# Patient Record
Sex: FEMALE | Race: WHITE
[De-identification: names, ages, dates, MRNs, and addresses within clinical notes are randomized per-mention and may not be internally consistent; named-entity substitution may affect disease eponyms.]

---

## 2022-04-04 NOTE — ED
Abdominal Pain HPI





- General


Chief Complaint: Vaginal Bleeding


Stated Complaint: Vaginal Bleeding,N/V,Back Pain


Time Seen by Provider: 04/04/22 00:03


Source: patient, family, RN notes reviewed, old records reviewed


Mode of arrival: wheelchair


Limitations: no limitations





- History of Present Illness


Initial Comments: 





This is a 41-year-old female to the emergency department for evaluation.  

Patient coming in for evaluation multiple complaints patient feels very cold 

weak and tired.  She does have significant back pain as well as abdominal pain. 

Patient has no other complaints.  Patient does suffer from significantly heavy 

periods with significant amount of pain with her periods recently started on in 

birth control otherwise no medical history takes no medications does have 

history of fibroids but no surgeries


MD Complaint: abdominal pain


-: days(s) (4)


Location: suprapubic


Radiation: suprapubic, back


Migration to: suprapubic, bilateral flank


Severity: severe


Severity scale (1-10): 8


Quality: aching, sharp


Improves With: nothing


Worsens With: nothing


Associated Symptoms: nausea, fever


Treatments Prior to Arrival: NSAIDs





- Related Data


                                  Previous Rx's











 Medication  Instructions  Recorded


 


Cephalexin [Keflex] 500 mg PO Q6HR #28 cap 04/04/22











                                    Allergies











Allergy/AdvReac Type Severity Reaction Status Date / Time


 


No Known Allergies Allergy   Verified 04/03/22 22:23














Review of Systems


ROS Statement: 


Those systems with pertinent positive or pertinent negative responses have been 

documented in the HPI.





ROS Other: All systems not noted in ROS Statement are negative.





Past Medical History


Past Medical History: No Reported History


History of Any Multi-Drug Resistant Organisms: MRSA


Date of last positivie culture/infection: 10/26/21


MDRO Source:: MRSA EAR


Past Surgical History: No Surgical Hx Reported


Past Psychological History: No Psychological Hx Reported


Smoking Status: Current every day smoker


Past Alcohol Use History: Rare


Past Drug Use History: None Reported





General Exam


General appearance: alert, in no apparent distress, anxious


Head exam: Present: atraumatic, normocephalic, normal inspection


Eye exam: Present: normal appearance, PERRL, EOMI.  Absent: scleral icterus, 

conjunctival injection, periorbital swelling


ENT exam: Present: normal exam, mucous membranes moist


Neck exam: Present: normal inspection.  Absent: tenderness, meningismus, 

lymphadenopathy


Respiratory exam: Present: normal lung sounds bilaterally.  Absent: respiratory 

distress, wheezes, rales, rhonchi, stridor


Cardiovascular Exam: Present: normal rhythm, tachycardia, normal heart sounds.  

Absent: systolic murmur, diastolic murmur, rubs, gallop, clicks


GI/Abdominal exam: Present: soft, normal bowel sounds.  Absent: distended, 

tenderness, guarding, rebound, rigid


Extremities exam: Present: normal inspection, full ROM, normal capillary refill.

  Absent: tenderness, pedal edema, joint swelling, calf tenderness


Back exam: Present: normal inspection


Neurological exam: Present: alert, oriented X3, CN II-XII intact


Psychiatric exam: Present: normal affect, normal mood


Skin exam: Present: warm, dry, intact, normal color.  Absent: rash





Course


                                   Vital Signs











  04/03/22 04/04/22





  22:19 02:11


 


Temperature 100.3 F H 


 


Pulse Rate 111 H 82


 


Respiratory 18 18





Rate  


 


Blood Pressure 148/94 126/77


 


O2 Sat by Pulse 100 99





Oximetry  














- Reevaluation(s)


Reevaluation #1: 





04/04/22 01:35


Medical record is reviewed


Reevaluation #2: 





04/04/22 01:35


Patient informed of her recurrent fever


Reevaluation #3: 





04/04/22 03:00


Patient informed results and questions answered


Reevaluation #4: 





04/04/22 03:00


Patient does not currently feel like she might pass out





Medical Decision Making





- Medical Decision Making





41-year-old female DF for evaluation patient coming with excessive vaginal 

bleeding had a vaginal bleeding.  Patient hemoglobin is not a treatable limit 

here in the emergency department.  Patient will be placed on antibiotics 

secondary to fever likely UTI.  Patient placed on antibiotics and can be 

discharged home





- Lab Data


Result diagrams: 


                                 04/03/22 23:21





                                 04/03/22 23:21


                                   Lab Results











  04/03/22 04/03/22 04/04/22 Range/Units





  23:21 23:21 00:24 


 


WBC  10.8 H    (3.8-10.6)  k/uL


 


RBC  4.41    (3.80-5.40)  m/uL


 


Hgb  13.4    (11.4-16.0)  gm/dL


 


Hct  40.5    (34.0-46.0)  %


 


MCV  91.9    (80.0-100.0)  fL


 


MCH  30.4    (25.0-35.0)  pg


 


MCHC  33.1    (31.0-37.0)  g/dL


 


RDW  12.9    (11.5-15.5)  %


 


Plt Count  410    (150-450)  k/uL


 


MPV  7.5    


 


Neutrophils %  65    %


 


Lymphocytes %  27    %


 


Monocytes %  3    %


 


Eosinophils %  3    %


 


Basophils %  0    %


 


Neutrophils #  7.0    (1.3-7.7)  k/uL


 


Lymphocytes #  2.9    (1.0-4.8)  k/uL


 


Monocytes #  0.4    (0-1.0)  k/uL


 


Eosinophils #  0.3    (0-0.7)  k/uL


 


Basophils #  0.0    (0-0.2)  k/uL


 


Sodium   136 L   (137-145)  mmol/L


 


Potassium   3.9   (3.5-5.1)  mmol/L


 


Chloride   107   ()  mmol/L


 


Carbon Dioxide   21 L   (22-30)  mmol/L


 


Anion Gap   8   mmol/L


 


BUN   11   (7-17)  mg/dL


 


Creatinine   0.63   (0.52-1.04)  mg/dL


 


Est GFR (CKD-EPI)AfAm   >90   (>60 ml/min/1.73 sqM)  


 


Est GFR (CKD-EPI)NonAf   >90   (>60 ml/min/1.73 sqM)  


 


Glucose   103 H   (74-99)  mg/dL


 


Calcium   9.2   (8.4-10.2)  mg/dL


 


Total Bilirubin   0.6   (0.2-1.3)  mg/dL


 


AST   31   (14-36)  U/L


 


ALT   22   (4-34)  U/L


 


Alkaline Phosphatase   78   ()  U/L


 


Total Protein   7.5   (6.3-8.2)  g/dL


 


Albumin   4.3   (3.5-5.0)  g/dL


 


Urine Color    Light Red  


 


Urine Appearance    Clear  (Clear)  


 


Urine pH    5.5  (5.0-8.0)  


 


Ur Specific Gravity    1.014  (1.001-1.035)  


 


Urine Protein    1+ H  (Negative)  


 


Urine Glucose (UA)    Negative  (Negative)  


 


Urine Ketones    Negative  (Negative)  


 


Urine Blood    Large H  (Negative)  


 


Urine Nitrite    Negative  (Negative)  


 


Urine Bilirubin    Negative  (Negative)  


 


Urine Urobilinogen    <2.0  (<2.0)  mg/dL


 


Ur Leukocyte Esterase    Trace H  (Negative)  


 


Urine RBC    >182 H  (0-5)  /hpf


 


Urine WBC    >182 H  (0-5)  /hpf


 


Urine Mucus    Rare H  (None)  /hpf


 


Urine HCG, Qual     (Not Detectd)  














  04/04/22 Range/Units





  00:24 


 


WBC   (3.8-10.6)  k/uL


 


RBC   (3.80-5.40)  m/uL


 


Hgb   (11.4-16.0)  gm/dL


 


Hct   (34.0-46.0)  %


 


MCV   (80.0-100.0)  fL


 


MCH   (25.0-35.0)  pg


 


MCHC   (31.0-37.0)  g/dL


 


RDW   (11.5-15.5)  %


 


Plt Count   (150-450)  k/uL


 


MPV   


 


Neutrophils %   %


 


Lymphocytes %   %


 


Monocytes %   %


 


Eosinophils %   %


 


Basophils %   %


 


Neutrophils #   (1.3-7.7)  k/uL


 


Lymphocytes #   (1.0-4.8)  k/uL


 


Monocytes #   (0-1.0)  k/uL


 


Eosinophils #   (0-0.7)  k/uL


 


Basophils #   (0-0.2)  k/uL


 


Sodium   (137-145)  mmol/L


 


Potassium   (3.5-5.1)  mmol/L


 


Chloride   ()  mmol/L


 


Carbon Dioxide   (22-30)  mmol/L


 


Anion Gap   mmol/L


 


BUN   (7-17)  mg/dL


 


Creatinine   (0.52-1.04)  mg/dL


 


Est GFR (CKD-EPI)AfAm   (>60 ml/min/1.73 sqM)  


 


Est GFR (CKD-EPI)NonAf   (>60 ml/min/1.73 sqM)  


 


Glucose   (74-99)  mg/dL


 


Calcium   (8.4-10.2)  mg/dL


 


Total Bilirubin   (0.2-1.3)  mg/dL


 


AST   (14-36)  U/L


 


ALT   (4-34)  U/L


 


Alkaline Phosphatase   ()  U/L


 


Total Protein   (6.3-8.2)  g/dL


 


Albumin   (3.5-5.0)  g/dL


 


Urine Color   


 


Urine Appearance   (Clear)  


 


Urine pH   (5.0-8.0)  


 


Ur Specific Gravity   (1.001-1.035)  


 


Urine Protein   (Negative)  


 


Urine Glucose (UA)   (Negative)  


 


Urine Ketones   (Negative)  


 


Urine Blood   (Negative)  


 


Urine Nitrite   (Negative)  


 


Urine Bilirubin   (Negative)  


 


Urine Urobilinogen   (<2.0)  mg/dL


 


Ur Leukocyte Esterase   (Negative)  


 


Urine RBC   (0-5)  /hpf


 


Urine WBC   (0-5)  /hpf


 


Urine Mucus   (None)  /hpf


 


Urine HCG, Qual  Not Detected  (Not Detectd)  














- Radiology Data


Radiology results: report reviewed (CT head and pelvis is negative for 

significant acute findings), image reviewed





Disposition


Clinical Impression: 


 UTI (urinary tract infection), Menorrhagia, Dysfunctional uterine bleeding, 

Vaginal bleeding





Disposition: HOME SELF-CARE


Condition: Fair


Instructions (If sedation given, give patient instructions):  Menorrhagia (ED)


Prescriptions: 


Cephalexin [Keflex] 500 mg PO Q6HR #28 cap


Is patient prescribed a controlled substance at d/c from ED?: No


Referrals: 


Italo Ritchie MD [Primary Care Provider] - 1-2 days


Sylvie Muñiz DO [Doctor of Osteopathic Medicine] - 1-2 days

## 2022-04-04 NOTE — CT
EXAMINATION TYPE: CT abdomen pelvis w con

 

DATE OF EXAM: 4/4/2022

 

COMPARISON: None

 

HISTORY: Abd Pain, N/V, Vaginal Bleeding

 

CT DLP: 911.7 mGycm

Automated exposure control for dose reduction was used.

 

CONTRAST: 

Performed with IV Contrast, patient injected with 100 mL of Isovue 300.

Images obtained from the diaphragm to the floor of the pelvis with IV contrast.

 

Lung bases are clear. There is no pleural effusion. Heart size is normal. There is no pericardial eff
usion.

 

Liver spleen and stomach pancreas appear intact. There is single calcified gallstone. The bile ducts 
are not dilated. Gallbladder has normal size.

 

There is no adrenal mass. Kidneys show satisfactory contrast opacification. There is no hydronephrosi
s. Delayed images show normal renal excretion. There is no retroperitoneal adenopathy. There is a con
e-shaped device in the vagina. The rectum appears normal. There is enlarged uterine fundus consistent
 with fibroids. There is no adnexal mass. No free fluid in the pelvis. Appendix is lateral and appear
s small. There is no mesenteric edema. No ascites or free air. No bowel obstruction.

 

IMPRESSION:

There is a 3.6 cm diameter object in the vagina which is elongated and cylindrical and cone-shaped wi
th some fluid at the upper end of the vagina. This could be a portion of the urinary bladder effaced 
and pinched off by the device.

## 2022-08-24 ENCOUNTER — HOSPITAL ENCOUNTER (OUTPATIENT)
Dept: HOSPITAL 47 - RADMAMWWP | Age: 42
Discharge: HOME | End: 2022-08-24
Attending: OBSTETRICS & GYNECOLOGY
Payer: COMMERCIAL

## 2022-08-24 DIAGNOSIS — Z12.31: Primary | ICD-10-CM

## 2022-08-24 DIAGNOSIS — R92.1: ICD-10-CM

## 2022-08-24 PROCEDURE — 77067 SCR MAMMO BI INCL CAD: CPT

## 2022-08-24 PROCEDURE — 77063 BREAST TOMOSYNTHESIS BI: CPT

## 2022-08-25 NOTE — MM
Reason for Exam: Screening  (asymptomatic). 

Last mammogram was performed 1 year(s) and 4 month(s) ago. 





Patient History: 

Hormonal Contraceptives for 4 years from age 37 until age 41. 





Risk Values: 

Mirta 5 year model risk: 0.4%.

[employer] Lifetime model risk: 6.7%.





Prior Study Comparison: 

4/15/2021 Bilateral MG screening mammo w CAD - 2, [facility]. 





Tissue Density: 

The breast tissue is extremely dense which could obscure a lesion on mammography.





Findings: 

Analyzed By CAD. 

There is no suspicious group of microcalcifications or new suspicious mass in either breast. Stable

benign-appearing calcifications bilaterally. 





Overall Assessment: Benign, BI-RAD 2





Management: 

Screening Mammogram of both breasts in 1 year.

A clinical breast exam by your physician is recommended on an annual basis and results should be

correlated with mammographic findings.



Electronically signed and approved by: Edilberto Jimenez D.O.